# Patient Record
Sex: MALE
[De-identification: names, ages, dates, MRNs, and addresses within clinical notes are randomized per-mention and may not be internally consistent; named-entity substitution may affect disease eponyms.]

---

## 2020-05-29 ENCOUNTER — HOSPITAL ENCOUNTER (OUTPATIENT)
Dept: HOSPITAL 61 - PNCL | Age: 50
End: 2020-05-29
Attending: ANESTHESIOLOGY
Payer: COMMERCIAL

## 2020-05-29 DIAGNOSIS — M51.16: Primary | ICD-10-CM

## 2020-05-29 DIAGNOSIS — Z98.52: ICD-10-CM

## 2020-05-29 DIAGNOSIS — M47.816: ICD-10-CM

## 2020-05-29 PROCEDURE — 62323 NJX INTERLAMINAR LMBR/SAC: CPT

## 2020-05-29 NOTE — PAIN
DATE OF SERVICE:  05/29/2020



INITIAL CONSULTATION FOR PAIN CLINIC



CHIEF COMPLAINT:  Low back and right lower extremity pain.



HISTORY OF PRESENT ILLNESS:  This is a 50-year-old male who presents with chief

complaint of pain in the low back, right lower extremity since he fell on the

ice about 4 months ago.  The patient has some low back pain in the past, but

nothing worse, radiating down to his right leg like it is now.  The patient

reports since that time is becoming more constant, sharp, radiating to the

anterior thigh, anterior medial thigh, medial lower leg and knee as well as

across the low back, mostly on the right side, but some on the left as well. 

The patient reports he has difficulty with sleeping, wakes him from sleep at

least once or twice a night, does not affect his bowel or bladder control, but

does affect his ability to walk in the right leg but would sometimes "give

out" when he is walking.  The patient reports he has not had any formal

physical therapy at this time.  He has been doing some stretching at home, which

was recommended by his primary physician.  The patient also seen a neurosurgeon

who is requesting conservative measures at this time.  The patient did have an

MRI scan of the lumbar spine on 03/12/2020 showing generalized disk bulging and

a protrusion at L3-L4 and L4-L5 with severe central spinal stenosis at the L3-L4

level, disk bulge, slightly more eccentric towards the right with severe

right-sided neural foraminal narrowing, moderate left neural foraminal

narrowing, L4-L5 shows generalized disk bulge eccentric towards the left with

severe hypertrophic posterior facet degenerative change eccentric towards the

right disk bulge, also with severe right-sided neural foraminal narrowing.  The

patient reports his disability range from 0-10, 10 being the worst, is an 8 in

all categories, family home responsibilities, recreation, social activity,

occupational activities, sexual behavior, self-care, social activity and life

support activities.  The patient has tried hydrocodone, which does decrease the

pain, took it as recently as yesterday by about 50%.



PAST MEDICAL HISTORY:  Significant for  no major medical problems or conditions

this worst.



PREVIOUS SURGERY:  Include a right biceps tendon repair, tonsillectomy, ear

tubes, bilateral hernia repair and vasectomy.



CURRENT MEDICATIONS:  Include only hydrocodone p.r.n.



ALLERGIES:  The patient has no known drug allergies.



FAMILY HISTORY:  Significant for no major medical problems or conditions he is

aware of.



SOCIAL HISTORY:  The patient does not drink alcohol, does not smoke.  Denies any

illegal, illicit or recreational drugs.  The patient is single, lives locally in

[place], [place] and works as a .



REVIEW OF SYSTEMS:  The patient's review of systems is positive for those items

mentioned in the history of present illness.  All systems reviewed and otherwise

negative.  It is complete, full and well documented on the patient's chart.



PHYSICAL EXAMINATION:

VITAL SIGNS:  The patient's blood pressure 173/100, pulse 60, respirations 18,

temperature 98.0 degrees Fahrenheit, height is 5 feet 6 inches, weight is 183

pounds.

GENERAL:  The patient is awake, alert, oriented, appropriate, very pleasant

demeanor.

HEENT:  Shows normocephalic, atraumatic.  Extraocular movements are intact and

symmetrical.  Oral cavity:  Mucous membranes moist and pink.  Dentition is

intact.

NECK:  Shows anterior throat supple without palpable lymphadenopathy noted. 

Swallow reflex symmetrical.

CHEST:  Shows normal on inspection.  Breath sounds are clear bilaterally.

HEART:  Shows S1, S2 clear.

ABDOMEN:  Soft, nontender, nondistended.  No palpable organomegaly is noted.  No

rebound or guarding demonstrated.

BACK:  Shows spine grossly in the midline.  Normal appearing thoracic kyphosis

and lumbar lordotic curvature.  Lumbar paraspinous muscle shows symmetrical on

inspection, on palpation shows some mild tenderness throughout the upper, middle

and lower distribution of paraspinous muscles, slightly more on the right than

the left with some mild tenderness over the right posterior superior iliac

spine, but no tenderness over the sacroiliac region itself over the sacrum or

the spinous processes with direct palpation.  The patient has good rotational

motion of lumbar spine, both laterally greater than 10 degrees right and left as

well as extension greater than 10 degrees, forward flexion 45 degrees without

significant increase in pain.

EXTREMITIES:  The patient's lower extremities show deep tendon reflexes 2+ in

the patellar, 1+ tendo-calcaneus, tendons are equal.  Motor exam is 4 on a scale

of 5 on the right with quadriceps and hamstring flexion 5/5 on the left. 

Peripheral pulses are 1+ posterior tibia.  No peripheral edema is noted

bilaterally.  Lower extremities are warm and dry to touch, equal in color and

appearance.  Straight leg raising noted to be negative for reproduction of

radicular symptoms bilaterally.  Gaenslen's and Landon's maneuvers are negative

bilaterally as well.  The patient is able to stand, stand on his toes without

significant difficulty or loss of balance, walks with a slight favoring gait of

the right lower extremity only very minimal, but not using any assistive devices

to ambulate today.

SKIN:  The patient's skin shows warm and dry, good turgor.  No edema.  No sores,

rashes or bruising throughout.



IMPRESSION:

1.  This is a 50-year-old male with approximate 4-month history after a fall

with pain in low back radiating to the right lower extremity in a radicular

fashion.

2.  MRI scan of lumbar spine as noted.



PLAN:  Options were discussed with the patient including conservative medical

managements, physical therapies and interventional techniques.  He would like to

pursue interventional techniques.  We discussed a lumbar epidural steroid

injection using description as well as anatomical models to describe the

procedure.  Risks were then discussed including, but not limited to bleeding,

infection, possibility of epidural hematoma, subsequent neurological compromise,

dural puncture, headaches, spinal cord and/or nerve damage, side effects of

steroid medication and poor results regarding pain control.  The patient

understands and wished to proceed.  The patient will return to the clinic in

approximately 2 weeks for followup.  He was counseled on return appointment,

activity level and side effects to be aware of.



DIAGNOSIS:  Lumbar radiculopathy with lumbar degenerative disk disease and

lumbar spondylosis.



PROCEDURE:  Lumbar epidural steroid injection, translaminar approach at the

L3-L4 level using C-arm fluoroscopic guidance under sterile prep and drape using

local anesthetic.



MEDICATION INJECTED:  A total of 120 mg Depo-Medrol plus 10 mL of

preservative-free normal saline and 2 mL of contrast.



CONDITION AT DISCHARGE:  Stable.  The patient tolerated the procedure well and

had no complications.

 



______________________________

SONIA VILLA MD



DR:  TIMBO/kali  JOB#:  [id number] / [id number]

DD:  05/29/2020 10:50  DT:  05/29/2020 11:24

## 2021-12-13 ENCOUNTER — HOSPITAL ENCOUNTER (OUTPATIENT)
Dept: HOSPITAL 35 - RAD | Age: 51
End: 2021-12-13
Attending: INTERNAL MEDICINE
Payer: COMMERCIAL

## 2021-12-13 DIAGNOSIS — R05.9: Primary | ICD-10-CM

## 2021-12-22 ENCOUNTER — HOSPITAL ENCOUNTER (OUTPATIENT)
Dept: HOSPITAL 35 - MRI | Age: 51
End: 2021-12-22
Attending: INTERNAL MEDICINE
Payer: COMMERCIAL

## 2021-12-22 DIAGNOSIS — R26.89: Primary | ICD-10-CM
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
